# Patient Record
Sex: MALE | Race: WHITE
[De-identification: names, ages, dates, MRNs, and addresses within clinical notes are randomized per-mention and may not be internally consistent; named-entity substitution may affect disease eponyms.]

---

## 2018-10-20 ENCOUNTER — HOSPITAL ENCOUNTER (EMERGENCY)
Dept: HOSPITAL 97 - ER | Age: 58
Discharge: HOME | End: 2018-10-20
Payer: COMMERCIAL

## 2018-10-20 VITALS — SYSTOLIC BLOOD PRESSURE: 119 MMHG | OXYGEN SATURATION: 99 % | DIASTOLIC BLOOD PRESSURE: 71 MMHG

## 2018-10-20 VITALS — TEMPERATURE: 97.9 F

## 2018-10-20 DIAGNOSIS — E65: ICD-10-CM

## 2018-10-20 DIAGNOSIS — K21.9: ICD-10-CM

## 2018-10-20 DIAGNOSIS — E11.9: ICD-10-CM

## 2018-10-20 DIAGNOSIS — I10: ICD-10-CM

## 2018-10-20 DIAGNOSIS — R10.812: Primary | ICD-10-CM

## 2018-10-20 LAB
ALBUMIN SERPL BCP-MCNC: 3.8 G/DL (ref 3.4–5)
ALP SERPL-CCNC: 50 U/L (ref 45–117)
ALT SERPL W P-5'-P-CCNC: 43 U/L (ref 12–78)
AST SERPL W P-5'-P-CCNC: 27 U/L (ref 15–37)
BUN BLD-MCNC: 16 MG/DL (ref 7–18)
GLUCOSE SERPLBLD-MCNC: 347 MG/DL (ref 74–106)
HCT VFR BLD CALC: 45.1 % (ref 39.6–49)
LYMPHOCYTES # SPEC AUTO: 2.2 K/UL (ref 0.7–4.9)
MCH RBC QN AUTO: 29.5 PG (ref 27–35)
MCV RBC: 87.1 FL (ref 80–100)
PMV BLD: 8.5 FL (ref 7.6–11.3)
POTASSIUM SERPL-SCNC: 3.9 MMOL/L (ref 3.5–5.1)
RBC # BLD: 5.18 M/UL (ref 4.33–5.43)

## 2018-10-20 PROCEDURE — 74177 CT ABD & PELVIS W/CONTRAST: CPT

## 2018-10-20 PROCEDURE — 80053 COMPREHEN METABOLIC PANEL: CPT

## 2018-10-20 PROCEDURE — 96360 HYDRATION IV INFUSION INIT: CPT

## 2018-10-20 PROCEDURE — 99284 EMERGENCY DEPT VISIT MOD MDM: CPT

## 2018-10-20 PROCEDURE — 85025 COMPLETE CBC W/AUTO DIFF WBC: CPT

## 2018-10-20 PROCEDURE — 36415 COLL VENOUS BLD VENIPUNCTURE: CPT

## 2018-10-20 PROCEDURE — 96372 THER/PROPH/DIAG INJ SC/IM: CPT

## 2018-10-20 PROCEDURE — 82962 GLUCOSE BLOOD TEST: CPT

## 2018-10-20 PROCEDURE — 71045 X-RAY EXAM CHEST 1 VIEW: CPT

## 2018-10-20 PROCEDURE — 81003 URINALYSIS AUTO W/O SCOPE: CPT

## 2018-10-20 PROCEDURE — 83690 ASSAY OF LIPASE: CPT

## 2018-10-20 NOTE — RAD REPORT
EXAM DESCRIPTION:  CT - Abdomen   Pelvis W Contrast - 10/20/2018 6:59 am

 

CLINICAL HISTORY:  Abdominal pain left-sided abdominal pain

 

COMPARISON:  2016

 

TECHNIQUE:  Computed axial tomography of the abdomen pelvis was obtained. 100 cc Isovue-300 was admin
istered intravenously. Oral contrast was not requested which limits evaluation of bowel.

 

All CT scans are performed using dose optimization technique as appropriate and may include automated
 exposure control or mA/KV adjustment according to patient size.

 

FINDINGS:  The liver, spleen, pancreas, adrenal and left kidney appear unremarkable. Tiny nonobstruct
ing right renal calculus is present.

 

Gallstones are present. Gallbladder wall does not appear thickened.

 

There is no evidence of diverticulitis. Appendix is normal

 

Small left pericardiac lymph node is unchanged. Postsurgical changes involve stomach

 

Spondylosis involves lumbar spine resulting in spinal stenosis

 

IMPRESSION:  Tiny nonobstructing right renal calculus

 

Cholelithiasis without evidence cholecystitis

## 2018-10-20 NOTE — RAD REPORT
EXAM DESCRIPTION:  Bharat Single View10/20/2018 5:37 am

 

CLINICAL HISTORY:  Chest pain

 

COMPARISON:  2016

 

FINDINGS:   The lungs appear clear of acute infiltrate. The heart is normal size

 

IMPRESSION:   No acute abnormalities displayed no

## 2018-10-20 NOTE — EDPHYS
Physician Documentation                                                                           

 [facility]                                                                

Name: Sharad Ramirez                                                                                

Age: 57 yrs                                                                                       

Sex: Male                                                                                         

: 1960                                                                                   

MRN: [id number]                                                                                   

Arrival Date: 10/20/2018                                                                          

Time: 04:45                                                                                       

Account#: [id number]                                                                            

Bed 19                                                                                            

Private MD: Martin Moore                                                                         

ED Physician Dom Walter                                                                      

HPI:                                                                                              

10/20                                                                                             

05:39 This 57 yrs old  Male presents to ER via Ambulatory with complaints of Lump on lissy 

      left side with pain.                                                                        

05:39 The patient presents with abdominal pain in the upper abdomen. Onset: The               lissy 

      symptoms/episode began/occurred 5 day(s) ago. The symptoms do not radiate. Associated       

      signs and symptoms: none. The symptoms are described as dull. Severity of pain: At its      

      worst the pain was mild in the emergency department the pain is unchanged.                  

                                                                                                  

Historical:                                                                                       

- Allergies:                                                                                      

05:03 No Known Allergies;                                                                     jb4 

- Home Meds:                                                                                      

05:03 metformin 500 mg Oral tab 2 tabs 2 times per day [Active]; victoza [Active]; tresiba    jb4 

      [Active]; meloxicam oral oral [Active]; valsartan oral oral [Active]; Prilosec 20 mg        

      Oral cpDR 1 cap once daily [Active]; Lasix Oral [Active];                                   

- PMHx:                                                                                           

05:03 BPH; Diabetes - NIDDM; GERD; Hypertension;                                              jb4 

- PSHx:                                                                                           

05:03 SHOULDERS X 2; GASTROPLASTY; HERNIATED DISCS X2; KNEES X 2;                             jb4 

                                                                                                  

- Immunization history:: Adult Immunizations up to date, Flu vaccine is not up to date.           

- Social history:: Smoking status: Patient/guardian denies using tobacco, Patient uses            

  alcohol, only on a social basis.                                                                

- Ebola Screening: : No symptoms or risks identified at this time.                                

- Family history:: not pertinent.                                                                 

                                                                                                  

                                                                                                  

ROS:                                                                                              

05:39 Constitutional: Negative for fever, chills, and weight loss, Eyes: Negative for injury, lissy 

      pain, redness, and discharge, ENT: Negative for injury, pain, and discharge, Neck:          

      Negative for injury, pain, and swelling, Cardiovascular: Negative for chest pain,           

      palpitations, and edema, Respiratory: Negative for shortness of breath, cough,              

      wheezing, and pleuritic chest pain, Back: Negative for injury and pain, : Negative        

      for injury, bleeding, discharge, and swelling, MS/Extremity: Negative for injury and        

      deformity, Skin: Negative for injury, rash, and discoloration, Neuro: Negative for          

      headache, weakness, numbness, tingling, and seizure, Psych: Negative for depression,        

      anxiety, suicide ideation, homicidal ideation, and hallucinations, Allergy/Immunology:      

      Negative for hives, rash, and allergies, Endocrine: Negative for neck swelling,             

      polydipsia, polyuria, polyphagia, and marked weight changes, Hematologic/Lymphatic:         

      Negative for swollen nodes, abnormal bleeding, and unusual bruising.                        

05:39 Abdomen/GI: Positive for abdominal pain, of the left upper quadrant.                        

                                                                                                  

Exam:                                                                                             

05:39 Constitutional:  This is a well developed, well nourished patient who is awake, alert,  lissy 

      and in no acute distress. Head/Face:  Normocephalic, atraumatic. Eyes:  Pupils equal        

      round and reactive to light, extra-ocular motions intact.  Lids and lashes normal.          

      Conjunctiva and sclera are non-icteric and not injected.  Cornea within normal limits.      

      Periorbital areas with no swelling, redness, or edema. ENT:  Nares patent. No nasal         

      discharge, no septal abnormalities noted.  Tympanic membranes are normal and external       

      auditory canals are clear.  Oropharynx with no redness, swelling, or masses, exudates,      

      or evidence of obstruction, uvula midline.  Mucous membranes moist. Neck:  Trachea          

      midline, no thyromegaly or masses palpated, and no cervical lymphadenopathy.  Supple,       

      full range of motion without nuchal rigidity, or vertebral point tenderness.  No            

      Meningismus. Chest/axilla:  Normal chest wall appearance and motion.  Nontender with no     

      deformity.  No lesions are appreciated. Cardiovascular:  Regular rate and rhythm with a     

      normal S1 and S2.  No gallops, murmurs, or rubs.  Normal PMI, no JVD.  No pulse             

      deficits. Respiratory:  Lungs have equal breath sounds bilaterally, clear to                

      auscultation and percussion.  No rales, rhonchi or wheezes noted.  No increased work of     

      breathing, no retractions or nasal flaring. Abdomen/GI:  Soft, non-tender, with normal      

      bowel sounds.  No distension or tympany.  No guarding or rebound.  No evidence of           

      tenderness throughout. Back:  No spinal tenderness.  No costovertebral tenderness.          

      Full range of motion. Male :  Normal genitalia with no discharge or lesions. Skin:        

      Warm, dry with normal turgor.  Normal color with no rashes, no lesions, and no evidence     

      of cellulitis. MS/ Extremity:  Pulses equal, no cyanosis.  Neurovascular intact.  Full,     

      normal range of motion. Neuro:  Awake and alert, GCS 15, oriented to person, place,         

      time, and situation.  Cranial nerves II-XII grossly intact.  Motor strength 5/5 in all      

      extremities.  Sensory grossly intact.  Cerebellar exam normal.  Normal gait. Psych:         

      Awake, alert, with orientation to person, place and time.  Behavior, mood, and affect       

      are within normal limits.                                                                   

                                                                                                  

Vital Signs:                                                                                      

05:03  / 76; Pulse 92; Resp 16; Temp 97.9; Pulse Ox 96% on R/A; Weight 131.54 kg (R);   jb4 

      Height 5 ft. 9 in. (175.26 cm) (R); Pain 4/10;                                              

06:00  / 72; Pulse 73; Resp 18; Pulse Ox 95% on R/A;                                    jb4 

07:34  / 83; Pulse 84; Resp 18; Pulse Ox 96% on R/A; Pain 4/10;                         em  

08:17  / 71; Pulse 71; Resp 16; Pulse Ox 97% on R/A;                                    em  

09:09  / 71; Pulse 63; Resp 16; Pulse Ox 99% on R/A;                                    em  

05:03 Body Mass Index 42.83 (131.54 kg, 175.26 cm)                                            4 

                                                                                                  

MDM:                                                                                              

05:02 Patient medically screened.                                                             [facility] 

05:42 Data reviewed: vital signs, nurses notes, lab test result(s), radiologic studies, CT    lissy 

      scan, plain films.                                                                          

                                                                                                  

10/20                                                                                             

05:39 Order name: CBC with Diff; Complete Time: 06:40                                         lissy 

10/20                                                                                             

05:39 Order name: Comprehensive Metabolic Panel; Complete Time: 06:50                         [facility] 

10/20                                                                                             

05:12 Order name: CXR XRAY                                                                    jb4 

10/20                                                                                             

05:39 Order name: CT Abd/Pelvis - W/Contrast; Complete Time: 08:42                            [facility] 

10/20                                                                                             

05:43 Order name: Lipase; Complete Time: 06:40                                                [facility] 

10/20                                                                                             

06:17 Order name: Urine Dipstick--Ancillary (enter results); Complete Time: 06:40             mw 

10/20                                                                                             

05:39 Order name: Urine Dipstick-Ancillary (obtain specimen); Complete Time: 06:18            [facility] 

10/20                                                                                             

07:49 Order name: FSBS; Complete Time: 08:17                                                  snw 

                                                                                                  

Administered Medications:                                                                         

06:18 Drug: NS 0.9% 1000 ml Route: IV; Rate: 1 bolus; Site: right antecubital;                jb4 

07:30 Follow up: IV Status: Completed infusion; IV Intake: 1000ml                             em  

07:19 Drug: Insulin Regular Human 10 units {Co-Signature: em (Sohail Galeana LVOSKAR).} Route:       ss  

      Sub-Q; Site: right lower abdomen;                                                           

08:17 Follow up: Response: No adverse reaction; Blood sugar is lowered                        em  

                                                                                                  

                                                                                                  

Point of Care Testing:                                                                            

      Blood Glucose:                                                                              

: Blood Glucose: 227 mg/dL;                                                               em  

      Ranges:                                                                                     

      Critical Glucose Levels:Adult <50 mg/dl or >400 mg/dl  <40 mg/dl or >180 mg/dl       

Disposition:                                                                                      

10/20/18 08:43 Discharged to Home. Impression: Abdominal tenderness - adipose tissue left         

  costal margin.                                                                                  

- Condition is Stable.                                                                            

- Discharge Instructions: Abdominal Pain, Adult, Type 2 Diabetes Mellitus, Diagnosis,             

  Adult, Abdominal Pain, Adult, Easy-to-Read.                                                     

                                                                                                  

- Medication Reconciliation Form, Thank You Letter, Antibiotic Education, Prescription            

  Opioid Use form.                                                                                

- Follow up: Martin Moore; When: 2 - 3 days; Reason: Recheck today's complaints,                  

  Continuance of care, Re-evaluation by your physician.                                           

- Problem is new.                                                                                 

- Symptoms have improved.                                                                         

                                                                                                  

                                                                                                  

                                                                                                  

Signatures:                                                                                       

Dispatcher [employer]                           EDMS                                                 

Dom Walter MD MD cha Therrien, Shelly, FNP-C                 FNP-Csnw                                                  

Sohail Galeana, KALI                       LVN  em                                                   

Kala Clark RN RN ss Bryson, James, RN RN   jb4                                                  

Sohail Galeana LVOSKAR                              em                                                   

                                                                                                  

Corrections: (The following items were deleted from the chart)                                    

09:10 08:43 10/20/2018 08:43 Discharged to Home. Impression: Abdominal tenderness - adipose   em  

      tissue left costal margin. Condition is Stable. Discharge Instructions: Abdominal Pain,     

      Adult, Type 2 Diabetes Mellitus, Diagnosis, Adult, Abdominal Pain, Adult, Easy-to-Read.     

      Forms are Medication Reconciliation Form, Thank You Letter, Antibiotic Education,           

      Prescription Opioid Use. Follow up: Martin Moore; When: 2 - 3 days; Reason: Recheck         

      today's complaints, Continuance of care, Re-evaluation by your physician. Problem is        

      new. Symptoms have improved. snw                                                            

                                                                                                  

**************************************************************************************************

## 2018-10-20 NOTE — ER
Nurse's Notes                                                                                     

 [facility]                                                                

Name: Sharad Ramirez                                                                                

Age: 57 yrs                                                                                       

Sex: Male                                                                                         

: 1960                                                                                   

MRN: [id number]                                                                                   

Arrival Date: 10/20/2018                                                                          

Time: 04:45                                                                                       

Account#: [id number]                                                                            

Bed 19                                                                                            

Private MD: Martin Moore                                                                         

Diagnosis: Abdominal tenderness-adipose tissue left costal margin                                 

                                                                                                  

Presentation:                                                                                     

10/20                                                                                             

04:58 Presenting complaint: Patient states: I noticed pain on my left side that started a     jb4 

      week and a half ago and on Thursday I noticed a lump on my left side. It didn't feel        

      right and I was going to go see Dr. Becerra but the office was closed. Transition of          

      care: patient was not received from another setting of care. Onset of symptoms was          

      2018. Risk Assessment: Do you want to hurt yourself or someone else?            

      Patient reports no desire to harm self or others. Initial Sepsis Screen: Does the           

      patient meet any 2 criteria? HR > 90 bpm. Yes Does the patient have a suspected source      

      of infection? No. Patient's initial sepsis screen is negative. Care prior to arrival:       

      None.                                                                                       

04:58 Method Of Arrival: Ambulatory                                                           jb4 

04:58 Acuity: DUYEN 4                                                                           jb4 

                                                                                                  

Triage Assessment:                                                                                

05:03 General: Appears in no apparent distress. comfortable, Behavior is calm, cooperative,   jb4 

      appropriate for age, Lump noted under the left breast.. Pain: Complains of pain in left     

      lateral anterior chest Pain does not radiate. Pain currently is 4 out of 10 on a pain       

      scale. at worst was 6 out of 10 on a pain scale. Quality of pain is described as dull,      

      pressure, Pain began 1.5 weeks ago Is intermittent. EENT: No signs and/or symptoms were     

      reported regarding the EENT system. Neuro: Level of Consciousness is awake, alert,          

      obeys commands, Oriented to person, place, time, situation. Cardiovascular: Patient's       

      skin is warm and dry. Respiratory: Airway is patent Respiratory effort is even,             

      unlabored, Respiratory pattern is regular, symmetrical. GI: No signs and/or symptoms        

      were reported involving the gastrointestinal system. : No signs and/or symptoms were      

      reported regarding the genitourinary system. Derm: Skin is intact, Skin is pink, warm \T\   

      dry. Musculoskeletal: Circulation, motion, and sensation intact.                            

                                                                                                  

Historical:                                                                                       

- Allergies:                                                                                      

05:03 No Known Allergies;                                                                     jb4 

- Home Meds:                                                                                      

05:03 metformin 500 mg Oral tab 2 tabs 2 times per day [Active]; victoza [Active]; tresiba    jb4 

      [Active]; meloxicam oral oral [Active]; valsartan oral oral [Active]; Prilosec 20 mg        

      Oral cpDR 1 cap once daily [Active]; Lasix Oral [Active];                                   

- PMHx:                                                                                           

05:03 BPH; Diabetes - NIDDM; GERD; Hypertension;                                              jb4 

- PSHx:                                                                                           

05:03 SHOULDERS X 2; GASTROPLASTY; HERNIATED DISCS X2; KNEES X 2;                             jb4 

                                                                                                  

- Immunization history:: Adult Immunizations up to date, Flu vaccine is not up to date.           

- Social history:: Smoking status: Patient/guardian denies using tobacco, Patient uses            

  alcohol, only on a social basis.                                                                

- Ebola Screening: : No symptoms or risks identified at this time.                                

- Family history:: not pertinent.                                                                 

                                                                                                  

                                                                                                  

Screenin:09 Abuse screen: Denies threats or abuse. Nutritional screening: No deficits noted.        jb4 

      Tuberculosis screening: No symptoms or risk factors identified. Fall Risk None              

      identified.                                                                                 

                                                                                                  

Assessment:                                                                                       

05:09 General: see triage assessment..                                                        jb4 

06:00 Reassessment: Patient appears in no apparent distress at this time. Patient and/or      jb4 

      family updated on plan of care and expected duration. Pain level reassessed. Patient is     

      alert, oriented x 3, equal unlabored respirations, skin warm/dry/pink.                      

06:40 Reassessment: PT to ct.                                                                 jb4 

07:34 Reassessment: Patient appears in no apparent distress at this time. Patient and/or      em  

      family updated on plan of care and expected duration. Pain level reassessed. Patient is     

      alert, oriented x 3, equal unlabored respirations, skin warm/dry/pink. Patient states       

      feeling better.                                                                             

08:18 Reassessment: Patient appears in no apparent distress at this time. Patient and/or      em  

      family updated on plan of care and expected duration. Pain level reassessed. Patient is     

      alert, oriented x 3, equal unlabored respirations, skin warm/dry/pink. Patient states       

      feeling better.                                                                             

09:09 Reassessment: Patient appears in no apparent distress at this time. Patient and/or      em  

      family updated on plan of care and expected duration. Pain level reassessed. Patient is     

      alert, oriented x 3, equal unlabored respirations, skin warm/dry/pink. Patient states       

      feeling better.                                                                             

                                                                                                  

Vital Signs:                                                                                      

05:03  / 76; Pulse 92; Resp 16; Temp 97.9; Pulse Ox 96% on R/A; Weight 131.54 kg (R);   jb4 

      Height 5 ft. 9 in. (175.26 cm) (R); Pain 4/10;                                              

06:00  / 72; Pulse 73; Resp 18; Pulse Ox 95% on R/A;                                    jb4 

07:34  / 83; Pulse 84; Resp 18; Pulse Ox 96% on R/A; Pain 4/10;                         em  

08:17  / 71; Pulse 71; Resp 16; Pulse Ox 97% on R/A;                                    em  

09:09  / 71; Pulse 63; Resp 16; Pulse Ox 99% on R/A;                                    em  

05:03 Body Mass Index 42.83 (131.54 kg, 175.26 cm)                                            jb4 

                                                                                                  

ED Course:                                                                                        

04:45 Patient arrived in ED.                                                                  es  

04:46 Martin Moore MD is Private Physician.                                                 es  

04:58 Thaddeus Erwin, RN is Primary Nurse.                                                     jb4 

05:01 Triage completed.                                                                       jb4 

05:02 Dom Walter MD is Attending Physician.                                             lissy 

05:03 Arm band placed on right wrist.                                                         jb4 

05:09 Patient has correct armband on for positive identification. Placed in gown. Call light  jb4 

      in reach. Side rails up X 1. Pulse ox on. NIBP on.                                          

05:37 X-ray completed. Portable x-ray completed in exam room. Patient tolerated procedure     ls3 

      well.                                                                                       

05:37 CXR XRAY In Process Unspecified.                                                        EDMS

06:00 Initial lab(s) drawn, by me, sent to lab. Urine collected: clean catch specimen, clear. jb4 

      Inserted saline lock: 20 gauge in right Blood collected.                                    

06:02 Mary Tolbert FNP-C is PHCP.                                                        snw 

06:19 Radiology exam delayed due to lab results not completed at this time. (BUN/Creatinine). nj  

06:43 Radiology exam delayed due to lab results not completed at this time. (BUN/Creatinine). nj  

06:53 Patient moved to CT via wheelchair.                                                     kw1 

06:59 CT Abd/Pelvis - W/Contrast In Process Unspecified.                                      EDMS

08:43 Martin Moore MD is Referral Physician.                                                snw 

09:08 No provider procedures requiring assistance completed. intact, bleeding controlled, No  em  

      redness/swelling at site. Pressure dressing applied.                                        

                                                                                                  

Administered Medications:                                                                         

06:18 Drug: NS 0.9% 1000 ml Route: IV; Rate: 1 bolus; Site: right antecubital;                jb4 

07:30 Follow up: IV Status: Completed infusion; IV Intake: 1000ml                             em  

07:19 Drug: Insulin Regular Human 10 units {Co-Signature: em (Sohail Galeana LVN).} Route:       ss  

      Sub-Q; Site: right lower abdomen;                                                           

08:17 Follow up: Response: No adverse reaction; Blood sugar is lowered                        em  

                                                                                                  

                                                                                                  

Point of Care Testing:                                                                            

      Blood Glucose:                                                                              

08:17 Blood Glucose: 227 mg/dL;                                                               em  

      Ranges:                                                                                     

                                                                                                  

Intake:                                                                                           

07:30 IV: 1000ml; Total: 1000ml.                                                              em  

                                                                                                  

Outcome:                                                                                          

08:43 Discharge ordered by MD.                                                                snw 

09:08 Discharged to home ambulatory.                                                          em  

09:08 Condition: good                                                                             

09:08 Discharge instructions given to patient, Instructed on discharge instructions, follow       

      up and referral plans. Demonstrated understanding of instructions, follow-up care.          

09:10 Patient left the ED.                                                                    em  

                                                                                                  

Signatures:                                                                                       

Dispatcher [employer]                           EDMS                                                 

Dom Walter MD MD cha Therrien, Shelly, FNP-C                 FNP-Csnw                                                  

Bridgette Velazco Edgar, LVN                       LVN  em                                                   

Kala Clark RN                      RN   Thaddeus Reyez RN                       RN   jb4                                                  

Larry Hobson Kimberly                            1                                                  

Lainey Guerra                                3                                                  

Sohail Galeana LVN                              em                                                   

                                                                                                  

**************************************************************************************************

## 2019-10-19 ENCOUNTER — HOSPITAL ENCOUNTER (OUTPATIENT)
Dept: HOSPITAL 97 - ER | Age: 59
Setting detail: OBSERVATION
LOS: 2 days | Discharge: HOME | End: 2019-10-21
Attending: FAMILY MEDICINE | Admitting: FAMILY MEDICINE
Payer: COMMERCIAL

## 2019-10-19 VITALS — BODY MASS INDEX: 43.1 KG/M2

## 2019-10-19 DIAGNOSIS — E87.6: ICD-10-CM

## 2019-10-19 DIAGNOSIS — E66.01: ICD-10-CM

## 2019-10-19 DIAGNOSIS — K80.00: Primary | ICD-10-CM

## 2019-10-19 DIAGNOSIS — K66.0: ICD-10-CM

## 2019-10-19 DIAGNOSIS — Z98.84: ICD-10-CM

## 2019-10-19 DIAGNOSIS — E11.65: ICD-10-CM

## 2019-10-19 DIAGNOSIS — E83.42: ICD-10-CM

## 2019-10-19 LAB
ALBUMIN SERPL BCP-MCNC: 3.9 G/DL (ref 3.4–5)
ALP SERPL-CCNC: 51 U/L (ref 45–117)
ALT SERPL W P-5'-P-CCNC: 55 U/L (ref 12–78)
AST SERPL W P-5'-P-CCNC: 32 U/L (ref 15–37)
BUN BLD-MCNC: 9 MG/DL (ref 7–18)
GLUCOSE SERPLBLD-MCNC: 281 MG/DL (ref 74–106)
HCT VFR BLD CALC: 47.1 % (ref 39.6–49)
INR BLD: 0.91
LIPASE SERPL-CCNC: 78 U/L (ref 73–393)
LYMPHOCYTES # SPEC AUTO: 1.3 K/UL (ref 0.7–4.9)
MAGNESIUM SERPL-MCNC: 1.7 MG/DL (ref 1.8–2.4)
NT-PROBNP SERPL-MCNC: 73 PG/ML (ref ?–125)
PMV BLD: 8.2 FL (ref 7.6–11.3)
POTASSIUM SERPL-SCNC: 4.6 MMOL/L (ref 3.5–5.1)
RBC # BLD: 5.22 M/UL (ref 4.33–5.43)
TROPONIN (EMERG DEPT USE ONLY): < 0.02 NG/ML (ref 0–0.04)
UA COMPLETE W REFLEX CULTURE PNL UR: (no result)

## 2019-10-19 PROCEDURE — 96367 TX/PROPH/DG ADDL SEQ IV INF: CPT

## 2019-10-19 PROCEDURE — 83735 ASSAY OF MAGNESIUM: CPT

## 2019-10-19 PROCEDURE — 96368 THER/DIAG CONCURRENT INF: CPT

## 2019-10-19 PROCEDURE — 96361 HYDRATE IV INFUSION ADD-ON: CPT

## 2019-10-19 PROCEDURE — 47562 LAPAROSCOPIC CHOLECYSTECTOMY: CPT

## 2019-10-19 PROCEDURE — 96365 THER/PROPH/DIAG IV INF INIT: CPT

## 2019-10-19 PROCEDURE — 94760 N-INVAS EAR/PLS OXIMETRY 1: CPT

## 2019-10-19 PROCEDURE — 81001 URINALYSIS AUTO W/SCOPE: CPT

## 2019-10-19 PROCEDURE — 71045 X-RAY EXAM CHEST 1 VIEW: CPT

## 2019-10-19 PROCEDURE — 80076 HEPATIC FUNCTION PANEL: CPT

## 2019-10-19 PROCEDURE — 84100 ASSAY OF PHOSPHORUS: CPT

## 2019-10-19 PROCEDURE — 88304 TISSUE EXAM BY PATHOLOGIST: CPT

## 2019-10-19 PROCEDURE — 96375 TX/PRO/DX INJ NEW DRUG ADDON: CPT

## 2019-10-19 PROCEDURE — 83880 ASSAY OF NATRIURETIC PEPTIDE: CPT

## 2019-10-19 PROCEDURE — 85610 PROTHROMBIN TIME: CPT

## 2019-10-19 PROCEDURE — 82962 GLUCOSE BLOOD TEST: CPT

## 2019-10-19 PROCEDURE — 82947 ASSAY GLUCOSE BLOOD QUANT: CPT

## 2019-10-19 PROCEDURE — 84484 ASSAY OF TROPONIN QUANT: CPT

## 2019-10-19 PROCEDURE — 85025 COMPLETE CBC W/AUTO DIFF WBC: CPT

## 2019-10-19 PROCEDURE — 74177 CT ABD & PELVIS W/CONTRAST: CPT

## 2019-10-19 PROCEDURE — 49329 UNLSTD LAPS PX ABD PERTM&OMN: CPT

## 2019-10-19 PROCEDURE — 83690 ASSAY OF LIPASE: CPT

## 2019-10-19 PROCEDURE — 99285 EMERGENCY DEPT VISIT HI MDM: CPT

## 2019-10-19 PROCEDURE — 76705 ECHO EXAM OF ABDOMEN: CPT

## 2019-10-19 PROCEDURE — 36415 COLL VENOUS BLD VENIPUNCTURE: CPT

## 2019-10-19 PROCEDURE — 80053 COMPREHEN METABOLIC PANEL: CPT

## 2019-10-19 PROCEDURE — 80048 BASIC METABOLIC PNL TOTAL CA: CPT

## 2019-10-19 PROCEDURE — 93005 ELECTROCARDIOGRAM TRACING: CPT

## 2019-10-19 RX ADMIN — HUMAN INSULIN SCH UNIT: 100 INJECTION, SOLUTION SUBCUTANEOUS at 16:30

## 2019-10-19 RX ADMIN — CIPROFLOXACIN SCH MLS: 2 INJECTION, SOLUTION INTRAVENOUS at 20:33

## 2019-10-19 RX ADMIN — METRONIDAZOLE SCH MLS: 500 INJECTION, SOLUTION INTRAVENOUS at 20:33

## 2019-10-19 RX ADMIN — Medication SCH ML: at 20:35

## 2019-10-19 RX ADMIN — DEXTROSE AND SODIUM CHLORIDE SCH MLS: 5; .45 INJECTION, SOLUTION INTRAVENOUS at 14:48

## 2019-10-19 RX ADMIN — HUMAN INSULIN SCH UNIT: 100 INJECTION, SOLUTION SUBCUTANEOUS at 20:33

## 2019-10-19 RX ADMIN — HUMAN INSULIN SCH UNIT: 100 INJECTION, SOLUTION SUBCUTANEOUS at 14:55

## 2019-10-19 NOTE — EDPHYS
Physician Documentation                                                                           

 [facility]                                                                 

Name: Sharad Ramirez                                                                                

Age: 58 yrs                                                                                       

Sex: Male                                                                                         

: 1960                                                                                   

MRN: [id number]                                                                                   

Arrival Date: 10/19/2019                                                                          

Time: 05:17                                                                                       

Account#: [id number]                                                                            

Bed 7                                                                                             

Private MD:                                                                                       

ALAYNA Physician Dom Walter                                                                      

HPI:                                                                                              

10/19                                                                                             

06:01 This 58 yrs old  Male presents to ER via Ambulatory with complaints of         lissy 

      Abdominal Pain, Nausea/Vomiting.                                                            

06:01 The patient presents to the emergency department with nausea, vomiting, abdominal pain, lissy 

      of the epigastric area and right upper quadrant. Onset: The symptoms/episode                

      began/occurred yesterday. Possible causes: unknown. The symptoms are aggravated by          

      movement, pressure, food , The symptoms are alleviated by remaining still. Associated       

      signs and symptoms: The patient has no apparent associated signs or symptoms. Severity      

      of symptoms: At their worst the symptoms were moderate in the emergency department the      

      symptoms are unchanged. The patient has not experienced similar symptoms in the past.       

                                                                                                  

Historical:                                                                                       

- Allergies:                                                                                      

05:27 No Known Allergies;                                                                     ea  

- Home Meds:                                                                                      

05:27 victoza [Active];                                                                       ea  

- PMHx:                                                                                           

05:27 BPH; Diabetes - NIDDM; GERD; Hypertension;                                              ea  

- PSHx:                                                                                           

05:27 SHOULDERS X 2; KNEES X 2; HERNIATED DISCS X2; GASTROPLASTY;                             ea  

                                                                                                  

- Immunization history:: Adult Immunizations up to date.                                          

- Social history:: Smoking status: Patient/guardian denies using tobacco.                         

- Ebola Screening: : No symptoms or risks identified at this time.                                

- Family history:: not pertinent.                                                                 

                                                                                                  

                                                                                                  

ROS:                                                                                              

06:01 Constitutional: Negative for fever, chills, and weight loss, Eyes: Negative for injury, lissy 

      pain, redness, and discharge, ENT: Negative for injury, pain, and discharge, Neck:          

      Negative for injury, pain, and swelling, Respiratory: Negative for shortness of breath,     

      cough, wheezing, and pleuritic chest pain, Back: Negative for injury and pain, :          

      Negative for injury, bleeding, discharge, and swelling, MS/Extremity: Negative for          

      injury and deformity, Skin: Negative for injury, rash, and discoloration, Neuro:            

      Negative for headache, weakness, numbness, tingling, and seizure, Psych: Negative for       

      depression, anxiety, suicide ideation, homicidal ideation, and hallucinations,              

      Allergy/Immunology: Negative for hives, rash, and allergies, Endocrine: Negative for        

      neck swelling, polydipsia, polyuria, polyphagia, and marked weight changes,                 

      Hematologic/Lymphatic: Negative for swollen nodes, abnormal bleeding, and unusual           

      bruising.                                                                                   

06:01 Cardiovascular: Positive for palpitations.                                                  

06:01 Abdomen/GI: Positive for abdominal pain, of the epigastric area, right upper quadrant       

      and left upper quadrant.                                                                    

                                                                                                  

Exam:                                                                                             

06:01 Constitutional:  This is a well developed, well nourished patient who is awake, alert,  lissy 

      and in no acute distress. Head/Face:  Normocephalic, atraumatic. Eyes:  Pupils equal        

      round and reactive to light, extra-ocular motions intact.  Lids and lashes normal.          

      Conjunctiva and sclera are non-icteric and not injected.  Cornea within normal limits.      

      Periorbital areas with no swelling, redness, or edema. ENT:  Nares patent. No nasal         

      discharge, no septal abnormalities noted.  Tympanic membranes are normal and external       

      auditory canals are clear.  Oropharynx with no redness, swelling, or masses, exudates,      

      or evidence of obstruction, uvula midline.  Mucous membranes moist. Neck:  Trachea          

      midline, no thyromegaly or masses palpated, and no cervical lymphadenopathy.  Supple,       

      full range of motion without nuchal rigidity, or vertebral point tenderness.  No            

      Meningismus. Chest/axilla:  Normal chest wall appearance and motion.  Nontender with no     

      deformity.  No lesions are appreciated. Respiratory:  Lungs have equal breath sounds        

      bilaterally, clear to auscultation and percussion.  No rales, rhonchi or wheezes noted.     

       No increased work of breathing, no retractions or nasal flaring. Back:  No spinal          

      tenderness.  No costovertebral tenderness.  Full range of motion. Male :  Normal          

      genitalia with no discharge or lesions. Skin:  Warm, dry with normal turgor.  Normal        

      color with no rashes, no lesions, and no evidence of cellulitis. MS/ Extremity:  Pulses     

      equal, no cyanosis.  Neurovascular intact.  Full, normal range of motion. Neuro:  Awake     

      and alert, GCS 15, oriented to person, place, time, and situation.  Cranial nerves          

      II-XII grossly intact.  Motor strength 5/5 in all extremities.  Sensory grossly intact.     

       Cerebellar exam normal.  Normal gait. Psych:  Awake, alert, with orientation to            

      person, place and time.  Behavior, mood, and affect are within normal limits.               

06:01 Cardiovascular: Rate: tachycardic, Rhythm: regular, Pulses: Pulses are 4+ in bilateral      

      radial, brachial, femoral, popliteal, posterior tibial and and dorsalis pedis               

      arteries.. Heart sounds: normal, Edema: is not appreciated, JVD: is not appreciated.        

06:01 Abdomen/GI: Inspection: distension, Bowel sounds: normal, Palpation: moderate abdominal     

      tenderness, in the right lower quadrant and left lower quadrant, Liver: no appreciated      

      palpable abnormalities, Hernia: not appreciated.                                            

                                                                                                  

Vital Signs:                                                                                      

05:27  / 82; Pulse 104; Resp 19; Temp 97.9; Pulse Ox 95% on R/A; Weight 131.54 kg;      ea  

      Height 5 ft. 9 in. (175.26 cm); Pain 0/10;                                                  

06:35  / 92; Pulse 76; Resp 18; Pulse Ox 94% on R/A;                                    ea  

07:11  / 73; Pulse 78; Resp 15; Pulse Ox 100% on R/A; Pain 0/10;                        hb  

08:16  / 76; Pulse 86; Resp 15; Pulse Ox 100% on R/A; Pain 0/10;                        hb  

09:45  / 6; Pulse 84; Resp 15; Pulse Ox 100% on R/A; Pain 1/10;                         hb  

10:45  / 78; Pulse 76; Resp 14; Pulse Ox 100% on R/A;                                   hb  

05:27 Body Mass Index 42.83 (131.54 kg, 175.26 cm)                                            ea  

                                                                                                  

MDM:                                                                                              

05:36 Patient medically screened.                                                             [facility] 

06:04 Data reviewed: vital signs, nurses notes, lab test result(s), EKG, radiologic studies,  [facility] 

      CT scan, plain films.                                                                       

09:46 Physician consultation: Roberto Roque MD was called at 09:46, was contacted at 09:46,  cp  

      regarding consult, patient's condition, would like admission per Dr. José Antonio Hutton MD.       

                                                                                                  

10/19                                                                                             

06:01 Order name: Basic Metabolic Panel; Complete Time: 07:02                                 [facility] 

10/19                                                                                             

06:01 Order name: CBC with Diff; Complete Time: 07:02                                         [facility] 

10/19                                                                                             

06:01 Order name: LFT's; Complete Time: 07:02                                                 [facility] 

10/19                                                                                             

06:01 Order name: Magnesium; Complete Time: 07:02                                             [facility] 

10/19                                                                                             

06:01 Order name: NT PRO-BNP; Complete Time: 07:02                                            [facility] 

10/19                                                                                             

06:01 Order name: PT-INR; Complete Time: 07:02                                                [facility] 

10/19                                                                                             

06:01 Order name: Troponin (emerg Dept Use Only); Complete Time: 07:02                        [facility] 

10/19                                                                                             

06:01 Order name: XRAY Chest (1 view); Complete Time: 09:13                                   [facility] 

10/19                                                                                             

06:01 Order name: Lipase; Complete Time: 07:02                                                [facility] 

10/19                                                                                             

06:01 Order name: US Abdomen Limited; Complete Time: 09:13                                    [facility] 

10/19                                                                                             

09:14 Interpretation: Report reviewed.                                                        cp  

10/19                                                                                             

06:01 Order name: CT Abd/Pelvis - PO and IV Contrast; Complete Time: 08:41                    lissy 

10/19                                                                                             

06:01 Order name: EKG; Complete Time: 06:01                                                   [facility] 

10/19                                                                                             

06:01 Order name: Cardiac monitoring; Complete Time: 06:50                                    [facility] 

10/19                                                                                             

06:01 Order name: EKG - Nurse/Tech; Complete Time: 06:50                                      [facility] 

10/19                                                                                             

06:01 Order name: IV Saline Lock; Complete Time: 06:28                                        [facility] 

10/19                                                                                             

06:01 Order name: Labs collected and sent; Complete Time: 06:28                               [facility] 

10/19                                                                                             

06:01 Order name: O2 Per Protocol; Complete Time: 06:28                                       [facility] 

10/19                                                                                             

06:01 Order name: O2 Sat Monitoring; Complete Time: 06:28                                     [facility] 

10/19                                                                                             

08:43 Order name: PO challenge; Complete Time: 09:20                                          cp  

10/19                                                                                             

09:48 Order name: NPO; Complete Time: 09:50                                                   cp  

                                                                                                  

Administered Medications:                                                                         

06:05 Drug: Zofran 4 mg Route: IVP; Site: right antecubital;                                  ea  

06:29 Follow up: Response: No adverse reaction; Nausea is decreased                           ea  

06:06 Drug: morphine 4 mg {Note: RASS 1.} Route: IVP; Site: right antecubital;                ea  

06:29 Follow up: Response: No adverse reaction; Pain is decreased; RASS: Alert and Calm (0)   ea  

06:25 Drug: NS 0.9% 1000 ml Route: IV; Rate: 1 bolus; Site: right antecubital;                ea  

07:39 Follow up: Response: No adverse reaction; IV Status: Completed infusion; IV Intake:     hb  

      1000ml                                                                                      

06:26 Drug: Pepcid 20 mg Route: IVP; Site: right antecubital;                                 ea  

07:11 Follow up: Response: No adverse reaction                                                hb  

07:11 Drug: Magnesium Sulfate 1 grams Route: IVPB; Infused Over: 1 hrs; Site: right           hb  

      antecubital;                                                                                

08:00 Follow up: Response: No adverse reaction; IV Status: Completed infusion; IV Intake: 50mlhb  

08:23 Drug: Zofran 4 mg Route: IVP; Site: right antecubital;                                  hb  

09:00 Follow up: Response: No adverse reaction; Nausea is decreased                           hb  

09:48 Drug: Phenergan 25 mg Route: IVP; Site: right antecubital;                              hb  

10:25 Follow up: Response: No adverse reaction; Nausea is decreased                           hb  

09:48 Drug: NS 0.9% 1000 ml Route: IV; Rate: 100 ml/hr; Site: right antecubital;              hb  

10:11 Drug: Cipro 400 mg Volume: 200 ml; Route: IVPB; Infused Over: 60 mins; Site: right      hb  

      antecubital;                                                                                

11:16 Follow up: Response: No adverse reaction; IV Status: Completed infusion; IV Intake:     hb  

      200ml                                                                                       

10:11 Drug: Flagyl 500 mg Volume: 100 ml; Route: IVPB; Rate: 200 ml/hr; Infused Over: 30      hb  

      mins; Site: right antecubital;                                                              

11:16 Follow up: Response: No adverse reaction; IV Status: Completed infusion; IV Intake:     hb  

      100ml                                                                                       

                                                                                                  

                                                                                                  

Disposition:                                                                                      

10/19/19 09:53 Hospitalization ordered by José Antonio Hutton for Inpatient Admission. Preliminary      

  diagnosis are Upper abdominal pain, unspecified - intractable, Nausea and                       

  vomiting - intractable, Cholelithiasis.                                                         

- Bed requested for Telemetry/MedSurg (Inpatient).                                                

- Status is Inpatient Admission.                                                              hb  

- Condition is Stable.                                                                            

- Problem is new.                                                                                 

- Symptoms have improved.                                                                         

UTI on Admission? No                                                                              

                                                                                                  

                                                                                                  

                                                                                                  

Addendum:                                                                                         

10/21/2019                                                                                        

     08:30 Co-signature as Attending Physician, Dom Walter MD I agree with the assessment and  c
ha

           plan of care.                                                                          

                                                                                                  

Signatures:                                                                                       

Dispatcher [employer]                           Estrella Santos RN                        Dom Avendano MD MD cha Page, Corey, PA PA cp Baxter, Heather, RN RN                                                      

Dorene Garza RN RN ea                                                   

                                                                                                  

Corrections: (The following items were deleted from the chart)                                    

10/19                                                                                             

11:38 09:53 Hospitalization Ordered by José Antonio Hutton MD for Inpatient Admission. Preliminary   dw  

      diagnosis is Upper abdominal pain, unspecified - intractable; Nausea and vomiting -         

      intractable; Cholelithiasis. Bed requested for Telemetry/MedSurg (Inpatient). Status is     

      Inpatient Admission. Condition is Stable. Problem is new. Symptoms have improved. UTI       

      on Admission? No. cp                                                                        

12:46 11:38 10/19/2019 09:53 Hospitalization Ordered by José Antonio Hutton MD for Inpatient         hb  

      Admission. Preliminary diagnosis is Upper abdominal pain, unspecified - intractable;        

      Nausea and vomiting - intractable; Cholelithiasis. Bed requested for Telemetry/MedSurg      

      (Inpatient). Status is Inpatient Admission. Condition is Stable. Problem is new.            

      Symptoms have improved. UTI on Admission? No. dw                                            

                                                                                                  

**************************************************************************************************

## 2019-10-19 NOTE — CON
Date of Consultation:  10/19/2019



Diagnoses:  Acute cholecystitis, acute abdominal pain, intractable abdominal pain, symptomatic cholel
ithiasis.



History Of Present Illness:  This is a case of a 58-year-old patient with history of morbid obesity, 
also history of diabetes, who comes in today complaining of epigastric right upper quadrant pain radi
ating to the back with nausea and bloating.  The pain did not subside in ER, so patient was admitted 
also for diabetes control and a surgical consult was obtained for cholecystectomy.  Patient states so
me nausea, some bloating.  No dysuria, hematuria, hematochezia, or melena.  No recent traveling out o
f the country.  No family members sick at home.  Patient had bariatric surgery in 1998.  He believe i
s a gastric banding.  He denied to do have any Stephen-en-Y.  This was in 1998, so we have no records.  
Patient has a midline incision in the abdomen.



Review of Systems:

Ten points were unremarkable.



Medical Problems:  Morbid obesity, diabetes type 2, GERD, and hypertension.



Medications:  Victoza.



Past Surgical History:  Include knee and shoulder surgery, __________ surgery, and gastroplasty.



Social History:  He does not smoke.  He does not drink alcohol.



Family History:  Noncontributory.



Physical Examination:

General:  The patient is awake and alert. 

HEENT:  Pupils are equal and reactive, anicteric. 

Neck:  Supple. 

Chest:  Clear. 

Abdomen:  Epigastric right upper quadrant tenderness with Rubio sign positive.  There is a midline i
ncision.  The rest of abdomen is soft and depressible. 

Rectal/Genitalia:  Deferred. 

Extremities:  Good capillary refill.



Diagnostic Studies:  Blood work reviewed with LFTs negative.  Ultrasound of the gallbladder shows gal
lstones.



Assessment:  This is the case of a 58-year-old patient with acute cholecystitis, symptomatic cholelit
hiasis, Rubio sign positive.  Patient wants to have surgery done during this admission.  So benefits
 and risks of laparoscopic, possible open cholecystectomy fully explained, which include but are not 
limited to infection, bleeding, damage to adjacent structures as complication, choledocholithiasis, b
ile leak, pancreatitis, MI, and even death.  He also understands this may not relieve any symptoms.  
He might need more than one surgical intervention.  He understands and he will sign a consent.





JAMEE/CHRIST

DD:  10/19/2019 17:29:24Voice ID:  [id number]

DT:  10/19/2019 22:27:11Report ID:  [id number]

## 2019-10-19 NOTE — RAD REPORT
EXAM DESCRIPTION:  US - Abdomen Exam Limited - 10/19/2019 8:26 am

 

CLINICAL HISTORY:  ABD PAIN

 

COMPARISON:  Abdomen   Pelvis W Contrast dated 10/19/2019

 

FINDINGS:  Multiple 5 mm or less gallstones seen. Gallstones have been previously diagnosed in this p
atient. No wall thickening or pericholecystic fluid. No duct stone identifiable. No dilatation of the
 biliary tree.

 

Fatty infiltration a partially imaged liver.

 

IMPRESSION:  Multi stone cholelithiasis as previously detailed. No acute gallbladder finding.

 

No duct stone identified and no biliary tree dilatation.

## 2019-10-19 NOTE — RAD REPORT
EXAM DESCRIPTION:  CT - Abdomen   Pelvis W Contrast - 10/19/2019 7:51 am

 

CLINICAL HISTORY:  ABD PAIN, nausea and vomiting history of gastric surgery

 

COMPARISON:  CT imaging October 2018

 

TECHNIQUE:  Biphasic, helical CT imaging of the abdomen and pelvis was performed following 100 ml non
-ionic IV contrast. Oral contrast was given.

 

All CT scans are performed using dose optimization technique as appropriate and may include automated
 exposure control or mA/KV adjustment according to patient size.

 

FINDINGS:  No suspicious findings in the lung bases.

 

Liver shows moderate diffuse fatty infiltration the liver. No focal liver lesion identified. No pancr
eatic or peripancreatic significant finding. Clips and calcification are present along the anterior s
uperior margin of the spleen. No acute splenic process.

 

 Multi stone cholelithiasis present similar to comparison. No wall thickening or edema seen. No duct 
stone or biliary tree dilatation.

 

Symmetric renal function is seen with no hydronephrosis or suspicious renal mass. No pyelonephritis o
r acute parenchymal process. No bladder abnormalities. No adrenal abnormalities.

 

No gastric dilatation or gastric wall thickening. No gastric outlet obstruction. Postsurgical changes
 to the stomach are present. No duodenum acute finding. Stomach and duodenum show no evidence for lissy
nge since October 2018.

 

No dilation of the large or small bowel. Retrocecal appendix is normal.  No free air, free fluid or i
nflammatory stranding.  No hernia, mass or bulky lymphadenopathy.

 

No suspicious bony findings.

 

 

IMPRESSION:  No bowel obstruction, free air or surgically emergent finding. No active process identif
iable.

 

Patient has multi stone cholelithiasis. No evidence for duct stone or acute gallbladder or biliary tr
ee process.

 

Moderate diffuse fatty infiltration of the liver.

## 2019-10-19 NOTE — RAD REPORT
EXAM DESCRIPTION:  RAD - Chest Single View - 10/19/2019 7:01 am

 

CLINICAL HISTORY:  Nausea, vomiting, abdominal pain

 

COMPARISON:  October 2018

 

TECHNIQUE:  AP portable chest image was obtained 0655 hours .

 

FINDINGS:  Lung volumes are low. No peripheral mass or consolidation. No significant failure or volum
e overload. Heart and vasculature are normal. No measurable pleural effusion and no pneumothorax. No 
acute bony abnormality seen. No acute aortic findings suspected.

 

IMPRESSION:  No acute cardiopulmonary process.

## 2019-10-19 NOTE — HP
Date of Admission:  10/19/2019



Primary Care Physician:  Dr. Moore.



Consultants:  Dr. Roque, Surgery.



Code Status:  Full.



Chief Complaint:  Abdominal pain.



History Of Present Illness:  The patient is a 58-year-old male with past 
medical history of diabetes, on insulin, who was in his usual state of health 
until night prior to admission, the patient started having sudden onset of 
right upper quadrant abdominal pain with some nausea and vomiting.  Patient 
also has decreased p.o. intake, worse with fried fatty foods.  Patient denies 
any diarrhea.  No fever or chills.  The patient's symptoms are constant, 
moderate, progressively worsening, nonradiating.  Patient came into the ER for 
further evaluation.  His workup was essentially negative including normal white 
blood cell count.  Liver enzymes were normal.  His magnesium was low at 1.7.  
Imaging studies showed multi stone cholelithiasis, no acute gallbladder 
finding.  CT scan of the abdomen did not show any surgically emergent finding.  
Moderate diffuse fatty infiltration of the liver.  Patient was then referred 
for admission.



Past Medical History:  Diabetes mellitus type 2, insulin requiring.  History of 
gallstones and kidney stones.



Past Surgical History:  Knee surgery, gastric sleeve, shoulder surgery x2, neck 
surgery and recent injection in his left hand for nodule.



Medications:  List reviewed.



Allergies:  NO KNOWN DRUG ALLERGIES.



Social History:  Patient denies any tobacco use.  Does drink alcohol, a couple 
of mixed drinks on the weekends.  No daily drinking.  No illicit drug use.  
Patient is independent in his activities of daily living.



Family History:  Positive for coronary artery disease, type 2 diabetes, 
hypertension, dyslipidemia, father  of cancer.  Mother had stroke.



Review of Systems:

Ten-point systems reviewed, negative except as per HPI.



Physical Examination:

Vital Signs:  Blood pressure 166/82, pulse 104, respirations 19, temperature 
97.9, O2 sat 95% on room air.  BMI is 42.8. 

General:  Awake, alert, oriented x3.  Morbidly obese male, in some mild 
distress due to pain. 

HEENT:  Normocephalic, atraumatic.  PERRLA.  EOMI.  Dry mucous membranes.  
Oropharynx is clear.  Conjunctivae are anicteric. 

Neck:  Supple.  No JVD.  Trachea midline. 

CV:  S1, S2.  Regular rate and rhythm.  Peripheral pulses present. 

Respiratory:  Moving air well bilaterally.  No wheezing or stridor.  No use of 
accessory muscles. 

Gastrointestinal:  Abdomen is soft.  Tenderness to palpation in the right upper 
quadrant.  No rebound or guarding.  Positive bowel sounds. 

Extremities:  No clubbing, cyanosis, or edema.  No calf tenderness. 

Neuro:  Cranial nerves 2 through 12 intact grossly.  No focal neurological 
deficits.  Speech is normal. 

Skin:  No rashes.  Normal skin turgor. 

Psych:  Mood is okay.  Affect is full.  Insight and judgment are good.



Laboratory Data:  Sodium 136, potassium 4.6, chloride 100, CO2 26, BUN 9, 
creatinine 0.77, glucose 281, calcium 9.7, magnesium 1.7.  Total bilirubin 0.8, 
AST 32, ALT 55, alkaline phosphatase 51.  Troponin less than 0.02.  Albumin 3.9
, lipase 78.  INR 0.91.  WBC is 5.9, H and H 16.6, 47.1, platelets 170, 
neutrophils 70%.



Imaging Studies:  CT scan of the abdomen and pelvis personally reviewed, shows 
no bowel obstruction, free air, or surgically emergent finding.  No active 
process.  Patient has multi stone cholelithiasis.  No evidence for duct, stone, 
or acute gallbladder or biliary tree process.  Moderate diffuse fatty 
infiltration of the liver.  Chest x-ray shows no acute cardiopulmonary process.
  Ultrasound of the abdomen shows multi stone cholelithiasis.  No acute 
gallbladder finding.  No stone identified.  No biliary tree dilatation.



Assessment:  A 58-year-old male with:

1.   Acute cholecystitis with symptomatic cholelithiasis without obstruction.  
Dr. Roque has been consulted.  We will continue on IV antibiotics and IV 
fluids.  Keep n.p.o.  No biliary tree obstruction seen on imaging studies.

2.   Morbid obesity.  BMI greater than 40.  Counseled.

3.   Diabetes mellitus type 2, insulin requiring, with hyperglycemia.  We will 
start on sliding scale insulin and monitor blood glucose levels.

4.   Hypomagnesemia.  We will replace and monitor.



Plan:  Admit the patient to Med-Surg, place on observation.





LETICIA

DD:  10/19/2019 13:12:02   Voice ID:  [id number]

MTDD

## 2019-10-19 NOTE — ER
Nurse's Notes                                                                                     

 [facility]                                                                 

Name: Sharad Ramirez                                                                                

Age: 58 yrs                                                                                       

Sex: Male                                                                                         

: 1960                                                                                   

MRN: [id number]                                                                                   

Arrival Date: 10/19/2019                                                                          

Time: 05:17                                                                                       

Account#: [id number]                                                                            

Bed 7                                                                                             

Private MD:                                                                                       

Diagnosis: Upper abdominal pain, unspecified-intractable;Nausea and                               

  vomiting-intractable;Cholelithiasis                                                             

                                                                                                  

Presentation:                                                                                     

10/19                                                                                             

05:24 Presenting complaint: Patient states: Reports nausea and vomiting that started at 10 PM ea  

      last night, pt reports he is having "abdominal discomfort, every time I vomit it's just     

      bile". Transition of care: patient was not received from another setting of care. Onset     

      of symptoms was 2019. Risk Assessment: Do you want to hurt yourself or          

      someone else? Patient reports no desire to harm self or others. Initial Sepsis Screen:      

      Does the patient meet any 2 criteria? No. Patient's initial sepsis screen is negative.      

      Does the patient have a suspected source of infection? No. Patient's initial sepsis         

      screen is negative. Care prior to arrival: None.                                            

05:24 Method Of Arrival: Ambulatory                                                           ea  

05:24 Acuity: DUYEN 3                                                                           ea  

                                                                                                  

Triage Assessment:                                                                                

05:28 General: Appears in no apparent distress. Behavior is calm, cooperative, appropriate    ea  

      for age. Pain: Denies pain. Neuro: Level of Consciousness is awake, alert, obeys            

      commands, Oriented to person, place, time, situation. Respiratory: Airway is patent         

      Respiratory effort is even, unlabored, Respiratory pattern is regular, symmetrical. GI:     

      Abdomen is non-distended. Derm: Skin is pink, warm \T\ dry.                                 

                                                                                                  

Historical:                                                                                       

- Allergies:                                                                                      

05:27 No Known Allergies;                                                                     ea  

- Home Meds:                                                                                      

05:27 victoza [Active];                                                                       ea  

- PMHx:                                                                                           

05:27 BPH; Diabetes - NIDDM; GERD; Hypertension;                                              ea  

- PSHx:                                                                                           

05:27 SHOULDERS X 2; KNEES X 2; HERNIATED DISCS X2; GASTROPLASTY;                             ea  

                                                                                                  

- Immunization history:: Adult Immunizations up to date.                                          

- Social history:: Smoking status: Patient/guardian denies using tobacco.                         

- Ebola Screening: : No symptoms or risks identified at this time.                                

- Family history:: not pertinent.                                                                 

                                                                                                  

                                                                                                  

Screenin:28 Abuse screen: Denies threats or abuse. Nutritional screening: No deficits noted.        ea  

      Tuberculosis screening: No symptoms or risk factors identified. Fall Risk None              

      identified.                                                                                 

                                                                                                  

Assessment:                                                                                       

05:28 Reassessment: see triage assessment.                                                    ea  

06:37 Reassessment: Patient and/or family updated on plan of care and expected duration. Pain ea  

      level reassessed. Patient is alert, oriented x 3, equal unlabored respirations, skin        

      warm/dry/pink.                                                                              

07:11 Reassessment: Patient appears in no apparent distress at this time. Patient and/or      hb  

      family updated on plan of care and expected duration. Pain level reassessed. Patient is     

      alert, oriented x 3, equal unlabored respirations, skin warm/dry/pink. Patient states       

      symptoms have improved.                                                                     

08:00 Reassessment: Patient appears in no apparent distress at this time. Patient and/or      hb  

      family updated on plan of care and expected duration. Pain level reassessed. Patient is     

      alert, oriented x 3, equal unlabored respirations, skin warm/dry/pink.                      

08:24 Reassessment: Pt c/o nausea, NAD, VSS. NAREN Fernandes notified, repeat Zofran administered as hb  

      ordered. US at bedside.                                                                     

09:15 Reassessment: Patient appears in no apparent distress at this time. Patient and/or      hb  

      family updated on plan of care and expected duration. Pain level reassessed. Patient is     

      alert, oriented x 3, equal unlabored respirations, skin warm/dry/pink.                      

09:20 Reassessment: Pt provided ginger ale as requested for PO Challenge.                     hb  

09:46 Reassessment: Pt actively vomiting, NAREN Fernandes notified, Phenergan administered as        hb  

      ordered. Pt NPO until further notice, pt verbalized understanding.                          

10:30 Reassessment: Patient appears in no apparent distress at this time. Patient and/or      hb  

      family updated on plan of care and expected duration. Pain level reassessed. Patient is     

      alert, oriented x 3, equal unlabored respirations, skin warm/dry/pink.                      

11:17 Reassessment: Patient appears in no apparent distress at this time. Patient and/or      hb  

      family updated on plan of care and expected duration. Pain level reassessed. Patient is     

      alert, oriented x 3, equal unlabored respirations, skin warm/dry/pink.                      

11:48 Reassessment: Attempted to call report to floor, no answer.                               

                                                                                                  

Vital Signs:                                                                                      

05:27  / 82; Pulse 104; Resp 19; Temp 97.9; Pulse Ox 95% on R/A; Weight 131.54 kg;      ea  

      Height 5 ft. 9 in. (175.26 cm); Pain 0/10;                                                  

06:35  / 92; Pulse 76; Resp 18; Pulse Ox 94% on R/A;                                    ea  

07:11  / 73; Pulse 78; Resp 15; Pulse Ox 100% on R/A; Pain 0/10;                        hb  

08:16  / 76; Pulse 86; Resp 15; Pulse Ox 100% on R/A; Pain 0/10;                        hb  

09:45  / 6; Pulse 84; Resp 15; Pulse Ox 100% on R/A; Pain 1/10;                         hb  

10:45  / 78; Pulse 76; Resp 14; Pulse Ox 100% on R/A;                                   hb  

05:27 Body Mass Index 42.83 (131.54 kg, 175.26 cm)                                            ea  

                                                                                                  

ED Course:                                                                                        

05:17 Patient arrived in ED.                                                                  ds1 

05:24 Dorene Garza RN is Primary Nurse.                                                    ea  

05:26 Triage completed.                                                                       ea  

05:26 Arm band placed on right wrist. Patient placed in an exam room, on a stretcher, on      ea  

      pulse oximetry.                                                                             

05:28 Patient has correct armband on for positive identification. Placed in gown. Bed in low  ea  

      position. Call light in reach.                                                              

05:36 Dom Walter MD is Attending Physician.                                             lissy 

05:59 Initial lab(s) drawn, by me, sent to lab. Inserted saline lock: 20 gauge in right       ak1 

      antecubital area, using aseptic technique. Blood collected.                                 

06:46 Note: Patient finished oral contrast at 6:15.                                           kw1 

07:01 XRAY Chest (1 view) In Process Unspecified.                                             EDMS

07:42 Dom Noriega PA is PHCP.                                                                cp  

07:51 CT Abd/Pelvis - PO and IV Contrast In Process Unspecified.                              EDMS

07:56 CT completed. Patient tolerated procedure well. Patient taken to ultrasound.            bq  

08:12 Ultrasound completed. Patient tolerated well. Note: pt returned to room via wheelchair  sg3 

      no issues.                                                                                  

08:26 US Abdomen Limited In Process Unspecified.                                              EDMS

09:52 José Antonio Hutton MD is Hospitalizing Provider.                                            cp  

09:53 Primary Nurse role handed off by Dorene Garza, RN                                     hb  

09:53 Heike Alfonso RN is Primary Nurse.                                                   hb  

12:07 No provider procedures requiring assistance completed. Patient admitted, IV remains in  hb  

      place.                                                                                      

                                                                                                  

Administered Medications:                                                                         

06:05 Drug: Zofran 4 mg Route: IVP; Site: right antecubital;                                  ea  

06:29 Follow up: Response: No adverse reaction; Nausea is decreased                           ea  

06:06 Drug: morphine 4 mg {Note: RASS 1.} Route: IVP; Site: right antecubital;                ea  

06:29 Follow up: Response: No adverse reaction; Pain is decreased; RASS: Alert and Calm (0)   ea  

06:25 Drug: NS 0.9% 1000 ml Route: IV; Rate: 1 bolus; Site: right antecubital;                ea  

07:39 Follow up: Response: No adverse reaction; IV Status: Completed infusion; IV Intake:     hb  

      1000ml                                                                                      

06:26 Drug: Pepcid 20 mg Route: IVP; Site: right antecubital;                                 ea  

07:11 Follow up: Response: No adverse reaction                                                hb  

07:11 Drug: Magnesium Sulfate 1 grams Route: IVPB; Infused Over: 1 hrs; Site: right           hb  

      antecubital;                                                                                

08:00 Follow up: Response: No adverse reaction; IV Status: Completed infusion; IV Intake: 50mlhb  

08:23 Drug: Zofran 4 mg Route: IVP; Site: right antecubital;                                  hb  

09:00 Follow up: Response: No adverse reaction; Nausea is decreased                           hb  

09:48 Drug: Phenergan 25 mg Route: IVP; Site: right antecubital;                              hb  

10:25 Follow up: Response: No adverse reaction; Nausea is decreased                           hb  

09:48 Drug: NS 0.9% 1000 ml Route: IV; Rate: 100 ml/hr; Site: right antecubital;              hb  

10:11 Drug: Cipro 400 mg Volume: 200 ml; Route: IVPB; Infused Over: 60 mins; Site: right      hb  

      antecubital;                                                                                

11:16 Follow up: Response: No adverse reaction; IV Status: Completed infusion; IV Intake:     hb  

      200ml                                                                                       

10:11 Drug: Flagyl 500 mg Volume: 100 ml; Route: IVPB; Rate: 200 ml/hr; Infused Over: 30      hb  

      mins; Site: right antecubital;                                                              

11:16 Follow up: Response: No adverse reaction; IV Status: Completed infusion; IV Intake:     hb  

      100ml                                                                                       

                                                                                                  

                                                                                                  

Intake:                                                                                           

07:39 IV: 1000ml; Total: 1000ml.                                                              hb  

08:00 IV: 50ml; Total: 1050ml.                                                                hb  

11:16 IV: 100ml; Total: 1150ml.                                                               hb  

11:16 IV: 200ml; Total: 1350ml.                                                               hb  

                                                                                                  

Outcome:                                                                                          

09:53 Decision to Hospitalize by Provider.                                                    cp  

12:07 Admitted to Tele accompanied by tech, via wheelchair, room 410, with chart.             hb  

12:07 Condition: stable                                                                           

12:07 Instructed on the need for admit, Demonstrated understanding of instructions.               

12:46 Patient left the ED.                                                                    hb  

                                                                                                  

Signatures:                                                                                       

Dispatcher [employer]                           EDMS                                                 

Dom Walter MD MD cha Quilty, Betty bq Sanford, Demi                                ds1                                                  

Alyssia Reilly RN                       RN   ak1                                                  

Dom Noriega PA PA cp Baxter, Heather, RN                     RN   Dorene Yi RN RN ea Wilhelm, Kimberly                            kw1                                                  

Kathy Florian                               sg3                                                  

                                                                                                  

**************************************************************************************************

## 2019-10-20 LAB
ALBUMIN SERPL BCP-MCNC: 3.1 G/DL (ref 3.4–5)
ALP SERPL-CCNC: 39 U/L (ref 45–117)
ALT SERPL W P-5'-P-CCNC: 41 U/L (ref 12–78)
AST SERPL W P-5'-P-CCNC: 24 U/L (ref 15–37)
BUN BLD-MCNC: 8 MG/DL (ref 7–18)
GLUCOSE SERPLBLD-MCNC: 241 MG/DL (ref 74–106)
HCT VFR BLD CALC: 42.9 % (ref 39.6–49)
LYMPHOCYTES # SPEC AUTO: 2 K/UL (ref 0.7–4.9)
MAGNESIUM SERPL-MCNC: 1.5 MG/DL (ref 1.8–2.4)
PMV BLD: 8 FL (ref 7.6–11.3)
POTASSIUM SERPL-SCNC: 3.6 MMOL/L (ref 3.5–5.1)
RBC # BLD: 4.71 M/UL (ref 4.33–5.43)

## 2019-10-20 PROCEDURE — 0FT44ZZ RESECTION OF GALLBLADDER, PERCUTANEOUS ENDOSCOPIC APPROACH: ICD-10-PCS

## 2019-10-20 PROCEDURE — 0DNW4ZZ RELEASE PERITONEUM, PERCUTANEOUS ENDOSCOPIC APPROACH: ICD-10-PCS

## 2019-10-20 RX ADMIN — Medication SCH ML: at 20:52

## 2019-10-20 RX ADMIN — DEXTROSE AND SODIUM CHLORIDE SCH MLS: 5; .45 INJECTION, SOLUTION INTRAVENOUS at 03:31

## 2019-10-20 RX ADMIN — HUMAN INSULIN SCH UNIT: 100 INJECTION, SOLUTION SUBCUTANEOUS at 17:04

## 2019-10-20 RX ADMIN — HUMAN INSULIN SCH: 100 INJECTION, SOLUTION SUBCUTANEOUS at 11:30

## 2019-10-20 RX ADMIN — Medication SCH: at 09:00

## 2019-10-20 RX ADMIN — METRONIDAZOLE SCH MLS: 500 INJECTION, SOLUTION INTRAVENOUS at 17:05

## 2019-10-20 RX ADMIN — CIPROFLOXACIN SCH MLS: 2 INJECTION, SOLUTION INTRAVENOUS at 21:51

## 2019-10-20 RX ADMIN — DEXTROSE AND SODIUM CHLORIDE SCH: 5; .45 INJECTION, SOLUTION INTRAVENOUS at 10:21

## 2019-10-20 RX ADMIN — METRONIDAZOLE SCH MLS: 500 INJECTION, SOLUTION INTRAVENOUS at 09:00

## 2019-10-20 RX ADMIN — PANTOPRAZOLE SODIUM SCH MG: 40 TABLET, DELAYED RELEASE ORAL at 05:02

## 2019-10-20 RX ADMIN — HUMAN INSULIN SCH UNIT: 100 INJECTION, SOLUTION SUBCUTANEOUS at 20:52

## 2019-10-20 RX ADMIN — METRONIDAZOLE SCH MLS: 500 INJECTION, SOLUTION INTRAVENOUS at 00:52

## 2019-10-20 RX ADMIN — HUMAN INSULIN SCH UNIT: 100 INJECTION, SOLUTION SUBCUTANEOUS at 08:37

## 2019-10-20 RX ADMIN — CIPROFLOXACIN SCH MLS: 2 INJECTION, SOLUTION INTRAVENOUS at 09:00

## 2019-10-20 NOTE — P.BOP
Preoperative diagnosis: acute cholecystitis, symptomatic cholelithiasis, morbid 

obesity, hx of david


Postoperative diagnosis: same, extensive intrabdominal adhesions


Primary procedure: Laparoscopic cholecystectomy


Secondary procedure: Laparoscopic extensivelysis of adhesions


Estimated blood loss: <20cc


Specimen: gb


Findings: see dictation


Anesthesia: General


Complications: None


Transferred to: Recovery Room


Condition: Good

## 2019-10-20 NOTE — EKG
Test Date:    2019-10-19               Test Time:    06:37:27

Technician:   ALICJA                                    

                                                     

MEASUREMENT RESULTS:                                       

Intervals:                                           

Rate:         72                                     

MA:           162                                    

QRSD:         98                                     

QT:           382                                    

QTc:          418                                    

Axis:                                                

P:            14                                     

MA:           162                                    

QRS:          -13                                    

T:            3                                      

                                                     

INTERPRETIVE STATEMENTS:                                       

                                                     

Normal sinus rhythm

Normal ECG

Compared to ECG 02/25/2016 13:37:35

No significant changes



Electronically Signed On 10-20-19 06:10:03 CDT by Asif Pineda

## 2019-10-20 NOTE — OP
Date of Procedure:  10/20/2019



Surgeon:  Roberto Roque MD



Preoperative Diagnoses:  Acute cholecystitis; symptomatic cholelithiasis; morbid obesity; history of 
gastric surgery, bariatric.



Postoperative Diagnoses:  Acute cholecystitis; symptomatic cholelithiasis; morbid obesity; history of
 gastric surgery, bariatric; extensive intra-abdominal adhesions.



Procedure:  Laparoscopic cholecystectomy, laparoscopic extensive lysis of adhesions.



Estimated Blood Loss:  Less than 20 cc.



Findings:  Extensive adhesions in the upper abdomen.  Patient has an open laparotomy done in 1998 for
 gastric procedure.  He does not remember the name of it.  Both adhesions needs to be removed in orde
r for us to access the area.  We spent about half the time in lysis of adhesions.



Anesthesia:  General plus local.



Specimen:  Gallbladder.



Indications For Procedure:  This the case of a male, who comes to us with above diagnosis.  Fully exp
lained the benefits and risks of laparoscopic, possible open cholecystectomy, which include but are n
ot limited to infection, bleeding, damage to adjacent structures, anesthesia complications, choledoch
olithiasis, bile leak, pancreatitis, MI, and even death.  He also understands this may not relieve an
y symptoms.  He might need more than one surgical intervention.  He understood, signed the consent.



Description Of Procedure:  Patient was brought to the operating room, placed in supine position.  Ane
sthesia was done without complication.  Abdominal area was prepped and draped in a sterile fashion.  
Marcaine 0.5% was injected for local anesthetic after time-out.  An incision was made in the infraumb
ilical region.  Incision was carried down to fascia, which was opened under direct vision.  Peritoneu
m was encountered, opened under direct vision.  Vicryl #1 placed inside of the fascia.  Javid trocar
 was carefully introduced.  No bleeding was obtained.  After that, we placed the camera in.  We notic
ed extensive intra-abdominal adhesions in the upper abdomen.  So, I found a little spot in the right 
upper quadrant that I could put a 5 mm trocar under direct visualization.  Using LigaSure device, we 
proceed with lysis of adhesions that took about half of the time of the surgery.  After removal of al
l the adhesions from the upper abdomen, then we were able to put 2 more trocars 5 mm in the right upp
er quadrant under direct visualization.  This allowed me to put a grasper in the fundus of the gallbl
adder.  More adhesions of omentum to the gallbladder were carefully removed.  Another grasper was patrick
junie in the infundibulum and the gallbladder was retracted in the inferolateral fashion exposing the t
riangle of Calot and obtaining critical view of safety.  Cystic duct and cystic artery were clearly i
solated, freed circumferentially, and a connection between those and the gallbladder was clearly iden
tified.  I proceeded to ligate those by using at least 3 clips proximal, 1 clip distal, ligation in m
iddle.  Same was done with the cystic artery.  A small little branch of the cystic artery was also li
gated protecting the hepatic arteries and common bile duct at all times.  At that moment, I proceeded
 to remove the gallbladder from liver using Bovie cauterizer and removed from abdominal cavity using 
the EndoCatch through the umbilical incision.  The area was inspected once again.  Clips were intact,
 gallbladder fossa with no bleeding, and area of lysis of adhesions with no bleeding.  At that moment
, I proceeded to remove the trocars under direct vision.  Deflated the pneumoperitoneum.  Closed the 
fascia with #1 Vicryl.  Irrigated subcu tissue, closed that with 3-0 chromic, and skin with staples. 
 Sponge count and instrument counts were correct.  The patient tolerated the procedure well.  The pat
ient was sent to recovery in stable condition.



Postoperative Plan:  If he tolerates diet and medically clear, then he might be able to go home tonig
ht or tomorrow after eating diet.  If he goes home, he was advised to follow up in my office in [facility].  Call 

for appointment at [phone].  Keep area dry for 48 hours, then may shower.  No heavy lifting, no more
 than 20 pounds.





JAMEE/CHRIST

DD:  10/20/2019 12:55:13Voice ID:  [id number]

DT:  10/20/2019 22:56:37Report ID:  [id number]

## 2019-10-21 VITALS — DIASTOLIC BLOOD PRESSURE: 65 MMHG | SYSTOLIC BLOOD PRESSURE: 124 MMHG | TEMPERATURE: 97.6 F

## 2019-10-21 VITALS — OXYGEN SATURATION: 95 %

## 2019-10-21 LAB
ALBUMIN SERPL BCP-MCNC: 2.9 G/DL (ref 3.4–5)
ALP SERPL-CCNC: 41 U/L (ref 45–117)
ALT SERPL W P-5'-P-CCNC: 98 U/L (ref 12–78)
AST SERPL W P-5'-P-CCNC: 81 U/L (ref 15–37)
BUN BLD-MCNC: 10 MG/DL (ref 7–18)
GLUCOSE SERPLBLD-MCNC: 200 MG/DL (ref 74–106)
HCT VFR BLD CALC: 41.5 % (ref 39.6–49)
LYMPHOCYTES # SPEC AUTO: 1.9 K/UL (ref 0.7–4.9)
MAGNESIUM SERPL-MCNC: 1.8 MG/DL (ref 1.8–2.4)
PMV BLD: 8.1 FL (ref 7.6–11.3)
POTASSIUM SERPL-SCNC: 3.5 MMOL/L (ref 3.5–5.1)
RBC # BLD: 4.54 M/UL (ref 4.33–5.43)

## 2019-10-21 RX ADMIN — METRONIDAZOLE SCH MLS: 500 INJECTION, SOLUTION INTRAVENOUS at 08:14

## 2019-10-21 RX ADMIN — PANTOPRAZOLE SODIUM SCH MG: 40 TABLET, DELAYED RELEASE ORAL at 05:36

## 2019-10-21 RX ADMIN — HYDROCODONE BITARTRATE AND ACETAMINOPHEN PRN TAB: 7.5; 325 TABLET ORAL at 03:57

## 2019-10-21 RX ADMIN — Medication SCH ML: at 08:15

## 2019-10-21 RX ADMIN — CIPROFLOXACIN SCH MLS: 2 INJECTION, SOLUTION INTRAVENOUS at 08:13

## 2019-10-21 RX ADMIN — METRONIDAZOLE SCH MLS: 500 INJECTION, SOLUTION INTRAVENOUS at 01:12

## 2019-10-21 RX ADMIN — HYDROCODONE BITARTRATE AND ACETAMINOPHEN PRN TAB: 7.5; 325 TABLET ORAL at 09:11

## 2019-10-21 RX ADMIN — HUMAN INSULIN SCH UNIT: 100 INJECTION, SOLUTION SUBCUTANEOUS at 08:13

## 2019-10-21 NOTE — PN
Date of Progress Note:  10/21/2019



Subjective:  Patient seen and examined.  Chart reviewed and case discussed with RN and Dr. Roque. 
 Patient is doing better.  No further nausea, able to ambulate, did have bowel movement.  Blood sugar
 levels are significantly better today.



Medications:  List reviewed.



Physical Examination:

Vital Signs:  Temperature 97.6, heart rate 74, blood pressure 124/65, respirations 18, O2 96% on room
 air. 

General:  Awake, alert, oriented x3, not in any acute distress. 

CV:  S1, S2.  Regular rate and rhythm.  Peripheral pulses present. 

Respiratory:  Moving air well bilaterally.  No wheezing or stridor. 

Gastrointestinal:  Abdomen is soft, nontender, nondistended.  Positive bowel sounds.  Incision site c
lean, dry, intact, bandaged. 

Extremities:  No clubbing, cyanosis, edema. 

Neurologic:  Nonfocal.



Laboratory Data:  Sodium 139, potassium 3.5, chloride 106, CO2 of 28, BUN 10, creatinine 0.83, glucos
e 200, calcium 8.1, magnesium 1.8.  AST 81, ALT 98, alkaline phosphatase 41.  WBC 5.9, H and H 14.4 a
nd 41.5, platelets 136.



Assessment And Plan:  

1.Acute cholecystitis without obstruction and the patient has symptomatic cholelithiasis, status pos
t laparoscopic cholecystectomy and lysis of adhesions.

2.Diabetes mellitus type 2, insulin requiring with hyperglycemia, not well controlled.

3.Morbid obesity.  BMI 43.1.

4.Hypomagnesemia, replaced.



Plan:  Discharge home.





LETICIA

DD:  10/21/2019 13:35:42Voice ID:  [id number]

DT:  10/21/2019 18:05:51Report ID:  [id number]

## 2019-10-21 NOTE — DS
Date of Discharge:  10/20/2019



Consultants:  Dr. Roque.



Procedure:  Laparoscopic cholecystectomy, extensive lysis of adhesions.



Discharge Diagnoses:  

1.   Acute symptomatic cholelithiasis with acute cholecystitis without 
obstruction.

2.   Abdominal adhesions status post lysis of adhesions.

3.   Morbid obesity.

4.   Diabetes mellitus type 2, insulin requiring with hyperglycemia.

5.   Hypomagnesemia.



Hospital Course:  Patient is a 58-year-old male with past medical history of 
diabetes on insulin, comes in with abdominal pain.  Patient was found to have 
symptomatic cholelithiasis and acute cholecystitis.  He was admitted to the 
hospital, was kept n.p.o., started on IV fluids, and IV antibiotics.  Dr. Roque with General Surgery was consulted.  Patient went for cholecystectomy, 
and tolerated the procedure well.  Patient also had significant adhesions which 
required correction.  Patient has had previous history of gastric surgery with 
gastric sleeve.  Overall, he did well postoperatively.  Pain was improved.  His 
imaging studies did not show any obstruction.  There was no common bile duct 
distention.  Choledocholithiasis was not suspected.  His bilirubin was normal.  
Liver enzymes were also normal. 



Patient did have some hypokalemia which was corrected.  His blood sugars were 
not well controlled.  He was counseled regarding diet and exercise changes.  
Patient was also on D5 half NS, which also contributed to the hyperglycemia as 
he was n.p.o. and diabetic.  Patient was then cleared for discharge as he was 
able to tolerate his diet.  He was then discharged home in a stable condition.



Activity:  No lifting more than 10 pounds.



Diet:  Diabetic.



Followup:  Follow up with primary care physician in 2-3 days, follow up with 
surgeon, Dr. Roque in 7 to 10 days for wound check.  Return to ER for 
worsening condition.



Instructions:  Wound care instructions per Dr. Roque.



Medications:  As per medication reconciliation list.



Physical Examination:

General:  Awake, alert, oriented x3.  No acute distress.  Obese male. 

CV:  S1-S2.  No murmurs. 

Respiratory:  Moving air well bilaterally.  No wheezing or stridor. 

Gastrointestinal:  Abdomen is soft, nontender, nondistended.  Positive bowel 
sounds. 

Extremities:  No clubbing, cyanosis, edema. 

Neurologic:  Nonfocal. 

Skin:  Incision site clean, dry, and intact.



ADDENDUM patient's blood sugar levels greater than 400 having some nausea after 
his diet.  Will adjust insulin dose monitor overnight.



SA/MODL

DD:  10/20/2019 13:57:26   Voice ID:  [id number]

DT:  10/21/2019 02:26:15   Report ID:  [id number]

MTDD

## 2019-10-21 NOTE — PN
Date of Progress Note:  10/21/2019



Diagnoses:  Acute cholecystitis, symptomatic cholelithiasis, morbid obesity, status post lap nubia.  
Patient going well.  No complaint.  No nausea, no vomiting.  No shortness of breath.



Objective:  Chest:  Clear. 

Abdomen:  Soft and depressible.  Intact surgical site. 

Extremities:  No calf pain.



Plan:  From a surgical standpoint, he is cleared to be discharged home.



Followup:  Follow up in my office in 1 week.  Call for appointment, [phone].  No heavy lifting, no m
ore than 20 pounds.  May take a shower tomorrow with dressings off.



Medications:  See orders.





JAMEE/MODL

DD:  10/21/2019 10:26:40Voice ID:  [id number]

DT:  10/21/2019 11:18:27Report ID:  [id number]

## 2019-11-10 NOTE — XMS REPORT
Summary of Care

 Created on:2019



Patient:SANTIAGO CHAMPAGNE

Sex:Male

:1960

External Reference #:[id number]





Demographics







 Address  [address] [place] [id number]

 

 Phone  Unavailable

 

 Preferred Language  English

 

 Marital Status  Unknown

 

 Yarsanism Affiliation  Unknown

 

 Race  Unknown

 

 Ethnic Group  Unknown









Author







 Name  TANNER MEEKS M.D.

 

 Address  Unavailable



   Unavailable



   ,









Care Team Providers







 Name  Role  Phone

 

 TANNER MEEKS M.D.  Unavailable  Unavailable

 

 TANNER MEEKS MD   Unavailable  Unavailable

 

 Unavailable  Unavailable  Unavailable









Functional Status







 Name  Dates  Details

 

 Functional status health issues are not documented    Status:









 Name  Dates  Details

 

 Cognitive status health issues are not documented    Status:







Problems







 Name  Dates  Details

 

 Carpal tunnel syndrome of left wrist (354.0, G56.02)    Status: Active

 

 Dupuytren's contracture of left hand (728.6, M72.0)    Status: Active

 

 Dupuytren's contracture of right hand (728.6, M72.0)    Status: Active







Medications







 Name  Dates  Details









 Victoza SOPN









    Refills: 0





Active

Tresiba SOLN







  Refills: 0





Active

metFORMIN HCl TABS







  Refills: 0





Active

Jardiance 10 MG Oral Tablet







  Refills: 0





Active

Meloxicam TABS







  Refills: 0





Active

PriLOSEC OTC TBEC







  Refills: 0





Active





Allergies and Adverse Reactions







 Name  Dates  Details

 

 No Known Drug Allergies (Allergy)    Status: Active







Past Medical History







 Name  Dates  Details

 

 History of Diabetes mellitus, type 2 (250.00, E11.9)    Status: Resolved







Procedures







 Procedure  Dates  Details

 

 Occupational Therapy  Date: 3-Prabhjot-2019  

 

 Post Op Promis 29 Survey  Date: 2019  

 

 History of Knee Surgery    Completed

 

 History of Neck Surgery    Completed

 

 History of Shoulder Surgery    Completed







Immunization







 Name  Dates  Details

 

 Immunizations not documented    







Social History







 Name  Dates  Details

 

 -    Status:









 Name  Dates  Details

 

 Former smoker    







Vital Signs







 Date  Test  Result  Details

 

       

 

   No Known Vitals to report    



       







Results







 Date  Description  Value  Details

 

   Results not documented    



       

 

       







Plan of Care







 Name  Dates  Details









 Planned Observations









 Post Op Promis 29 Survey  On: 2019  Intent

 

 Planned Goals not documented    







Interventions Provided

Labs/Procedures/ImagingOccupational Therapy; To Be Done: 2019PlanPatient 
Education/Instructions: Reassurance Orders: Occupational Therapy Follow Up: 
Patient will follow up in 6 weeks.I have recommended that he continue home 
strengthening and restorative care. The patient may return to activity as 
tolerated.



Instructions







 Name  Dates  Details

 

 Instructions not documented    







Encounters







 Appointment; TANNER MEEKS M.D.  On: 3-May-2019 10:30



 Encounter Diagnosis: Problem not documented  

 

 Appointment; TANNER MEEKS M.D.  On: 22-May-2019 10:00



 Encounter Diagnosis: Problem not documented  

 

 Appointment; TANNER MEEKS M.D.  On: 3-Prabhjot-2019 9:30



 Encounter Diagnosis: Problem not documented